# Patient Record
Sex: FEMALE | Race: WHITE | Employment: OTHER | URBAN - METROPOLITAN AREA
[De-identification: names, ages, dates, MRNs, and addresses within clinical notes are randomized per-mention and may not be internally consistent; named-entity substitution may affect disease eponyms.]

---

## 2018-08-09 PROBLEM — H35.3131: Noted: 2018-02-06

## 2018-08-09 PROBLEM — H04.123: Noted: 2018-02-06

## 2018-08-09 PROBLEM — E11.9: Noted: 2018-08-09

## 2018-08-09 PROBLEM — Z96.1: Noted: 2018-02-06

## 2018-10-03 ENCOUNTER — OFFICE VISIT (OUTPATIENT)
Dept: ORTHOPEDIC SURGERY | Age: 68
End: 2018-10-03

## 2018-10-03 VITALS
DIASTOLIC BLOOD PRESSURE: 76 MMHG | SYSTOLIC BLOOD PRESSURE: 122 MMHG | BODY MASS INDEX: 37.05 KG/M2 | RESPIRATION RATE: 16 BRPM | WEIGHT: 217 LBS | HEIGHT: 64 IN

## 2018-10-03 DIAGNOSIS — G89.29 CHRONIC BILATERAL LOW BACK PAIN WITH LEFT-SIDED SCIATICA: Primary | ICD-10-CM

## 2018-10-03 DIAGNOSIS — M54.16 LUMBAR RADICULOPATHY: ICD-10-CM

## 2018-10-03 DIAGNOSIS — R29.898 LEFT LEG WEAKNESS: ICD-10-CM

## 2018-10-03 DIAGNOSIS — M54.42 CHRONIC BILATERAL LOW BACK PAIN WITH LEFT-SIDED SCIATICA: Primary | ICD-10-CM

## 2018-10-03 PROBLEM — M54.40 CHRONIC BILATERAL LOW BACK PAIN WITH SCIATICA: Status: ACTIVE | Noted: 2018-10-03

## 2018-10-03 RX ORDER — ESZOPICLONE 3 MG/1
TABLET, FILM COATED ORAL
COMMUNITY
Start: 2018-07-27 | End: 2018-10-30 | Stop reason: SDUPTHER

## 2018-10-03 RX ORDER — METFORMIN HYDROCHLORIDE 500 MG/1
TABLET ORAL
COMMUNITY
Start: 2018-08-03

## 2018-10-03 RX ORDER — GABAPENTIN 300 MG/1
CAPSULE ORAL
COMMUNITY
Start: 2018-07-27

## 2018-10-03 RX ORDER — TOPIRAMATE 25 MG/1
TABLET ORAL
Qty: 90 TAB | Refills: 2 | Status: SHIPPED | OUTPATIENT
Start: 2018-10-03 | End: 2018-11-05 | Stop reason: SDUPTHER

## 2018-10-03 RX ORDER — PANTOPRAZOLE SODIUM 20 MG/1
TABLET, DELAYED RELEASE ORAL
COMMUNITY
Start: 2018-08-15

## 2018-10-03 RX ORDER — TIOTROPIUM BROMIDE 18 UG/1
CAPSULE ORAL; RESPIRATORY (INHALATION)
COMMUNITY
Start: 2018-08-03

## 2018-10-03 NOTE — PATIENT INSTRUCTIONS

## 2018-10-03 NOTE — PROGRESS NOTES
Chief complaint/History of Present Illness:  Chief Complaint   Patient presents with    Back Pain     NP here for low back pain, no recent injury     NEW PATIENT:    SHYAM Rogers is a  69 y.o.  female      HISTORY OF PRESENT ILLNESS:  This is a new patient appointment. Ms. Jacques Ruvalcaba comes in today as a new patient. She has had low back pain for the past 6 years. She states it started when she lived in [place] and had a bowel surgery where they accidentally stitched her sciatic nerve. They did go back in eventually and unstitched it, but since that time she has had low back pain with radiating left buttock and posterior leg pain down to her toes. It has progressively gotten worse over the years to the point over the last year she could hardly stand it. She rates her pain always between a 5/10 and 10/10. Today it is between an 8/10 and 10/10. She had breast cancer and has had bilateral mastectomies. She has gastritis, hyperlipidemia, COPD, diabetes and  macular degeneration. She has had multiple surgeries including bilateral mastectomies and the bowel and bladder surgeries. She does get bladder incontinence but that is more from a bladder problem due to the failed bladder tuck. She states she has had an injection in the past in [place]. She is not really sure what kind it was. It was in the back. It did not help at all. She has had physical therapy which made her pain worse. She is not doing a home exercise program currently due to her pain level. She describes the pain in her back as a sharp, stabbing pain and the pain in her leg as shooting pain down to her foot. She also gets terrible spasms in her left leg. She is on Neurontin 300 mg anywhere from 3 to 6 times per day depending on her pain level. Flexeril p.r.n   She has had Lyrica in the past.  It caused her to feel drugged and caused weight gain. The last MRI was over 3 years ago.       She denies fever, bowel or bladder dysfunction. She is retired. She is a nonsmoker. She does state she has some issues with her balance when she tries to walk. Her back pain is worse than the leg pain but the leg pain is quite significant. PHYSICAL EXAMINATION:  Ms. David Carmen is a 68-year-old female. She is alert and oriented. Normal mood and affect. She has a full weightbearing, slightly antalgic gait. No assistive device. She has terrible pain with hyperextension of the lumbar spine. She has 5/5 strength of her right lower extremity, 3/5 to 4/5 strength of the left lower extremity in all planes, hamstrings, quadriceps, iliotibial bands,  dorsiflexors and EHL. ASSESSMENT/PLAN:  This is a patient who has had low back pain for the past 6 years since having her bowel surgery. She has radiating left lower extremity pain. I am most concerned about the weakness in her left leg. We are going to add Topamax to her regimen, tapering up to 75 mg at night. She may continue to take the Neurontin b.i.d to t.i.d. We will get a new MRI. I will also get her records from the clinic in [place]. We will see her back after the MRI.            Review of systems:    Past Medical History:   Diagnosis Date    CA - breast cancer     Cancer [facility] [address]     breast    Chronic obstructive pulmonary disease (COPD) ([facility] [address]     Chronic obstructive pulmonary disease ([facility] [address]     Eczema      Past Surgical History:   Procedure Laterality Date    HX BLADDER REPAIR      bladder lift    HX BREAST RECONSTRUCTION      HX CHOLECYSTECTOMY      hernia    HX CHOLECYSTECTOMY      HX CYST REMOVAL      HX HERNIA REPAIR      HX HYSTERECTOMY      HX MASTECTOMY  bilateral    HX MASTECTOMY Bilateral     HX PARTIAL HYSTERECTOMY      HX RECTOCELE REPAIR      HX TONSIL AND ADENOIDECTOMY      REMOVAL OF BREAST IMPLANT       Social History     Social History    Marital status:      Spouse name: N/A    Number of children: N/A    Years of education: N/A Occupational History    Not on file. Social History Main Topics    Smoking status: Former Smoker    Smokeless tobacco: Never Used    Alcohol use No    Drug use: No    Sexual activity: Not on file     Other Topics Concern    Not on file     Social History Narrative    ** Merged History Encounter **          Family History   Problem Relation Age of Onset    Cancer Mother        Physical Exam:  Visit Vitals    /76    Resp 16    Ht 5' 4\" (1.626 m)    Wt 217 lb (98.4 kg)    BMI 37.25 kg/m2     Pain Scale: 8/10       has been . reviewed and is appropriate          Diagnoses and all orders for this visit:    1. Chronic bilateral low back pain with left-sided sciatica  -     AMB POC XRAY, SPINE, LUMBOSACRAL; 4+  -     topiramate (TOPAMAX) 25 mg tablet; 1 tab nightly  x 5 days, then 2 tabs nightly x 5 day and then 3 tabs nightly  Indications: ESSENTIAL TREMOR  -     MRI LUMB SPINE WO CONT; Future    2. Lumbar radiculopathy  -     AMB POC XRAY, SPINE, LUMBOSACRAL; 4+  -     topiramate (TOPAMAX) 25 mg tablet; 1 tab nightly  x 5 days, then 2 tabs nightly x 5 day and then 3 tabs nightly  Indications: ESSENTIAL TREMOR  -     MRI LUMB SPINE WO CONT; Future    3. Left leg weakness  -     AMB POC XRAY, SPINE, LUMBOSACRAL; 4+  -     topiramate (TOPAMAX) 25 mg tablet; 1 tab nightly  x 5 days, then 2 tabs nightly x 5 day and then 3 tabs nightly  Indications: ESSENTIAL TREMOR  -     MRI LUMB SPINE WO CONT; Future            Follow-up Disposition:  Return in about 4 weeks (around 10/31/2018) for with Dr Destinee Chisholm for MRI f/u (per Dr Destinee Chisholm).         We have informed Natalya Jacobs to notify us for immediate appointment if she has any worsening neurogical symptoms or if an emergency situation presents, then call [phone]

## 2018-10-08 ENCOUNTER — TELEPHONE (OUTPATIENT)
Dept: ORTHOPEDIC SURGERY | Age: 68
End: 2018-10-08

## 2018-10-08 DIAGNOSIS — F41.8 TEST ANXIETY: Primary | ICD-10-CM

## 2018-10-08 RX ORDER — DIAZEPAM 10 MG/1
TABLET ORAL
Qty: 1 TAB | Refills: 0 | OUTPATIENT
Start: 2018-10-08

## 2018-10-08 NOTE — TELEPHONE ENCOUNTER
Patient called to request that she be able to  the valium prescription discussed at the 10/30 office visit tomorrow. She states the valium is for upcoming MRI we ordered and that she will be in town on Tuesday.   Please contact patient to confirm this at [phone]

## 2018-10-11 ENCOUNTER — HOSPITAL ENCOUNTER (OUTPATIENT)
Dept: MRI IMAGING | Age: 68
Discharge: HOME | End: 2018-10-11
Attending: NURSE PRACTITIONER
Payer: MEDICARE

## 2018-10-11 DIAGNOSIS — R29.898 LEFT LEG WEAKNESS: ICD-10-CM

## 2018-10-11 DIAGNOSIS — M54.16 LUMBAR RADICULOPATHY: ICD-10-CM

## 2018-10-11 DIAGNOSIS — G89.29 CHRONIC BILATERAL LOW BACK PAIN WITH LEFT-SIDED SCIATICA: ICD-10-CM

## 2018-10-11 DIAGNOSIS — M54.42 CHRONIC BILATERAL LOW BACK PAIN WITH LEFT-SIDED SCIATICA: ICD-10-CM

## 2018-10-11 PROCEDURE — 72148 MRI LUMBAR SPINE W/O DYE: CPT

## 2018-10-30 ENCOUNTER — OFFICE VISIT (OUTPATIENT)
Dept: ORTHOPEDIC SURGERY | Age: 68
End: 2018-10-30

## 2018-10-30 VITALS
TEMPERATURE: 97.8 F | WEIGHT: 214.2 LBS | OXYGEN SATURATION: 96 % | DIASTOLIC BLOOD PRESSURE: 74 MMHG | RESPIRATION RATE: 17 BRPM | HEIGHT: 64 IN | BODY MASS INDEX: 36.57 KG/M2 | SYSTOLIC BLOOD PRESSURE: 122 MMHG | HEART RATE: 79 BPM

## 2018-10-30 DIAGNOSIS — G89.4 CHRONIC PAIN SYNDROME: Primary | ICD-10-CM

## 2018-10-30 DIAGNOSIS — M54.16 LUMBAR RADICULOPATHY: ICD-10-CM

## 2018-10-30 PROBLEM — E66.01 SEVERE OBESITY (HCC): Status: ACTIVE | Noted: 2018-10-30

## 2018-10-30 NOTE — PROGRESS NOTES
[facility] [address]  [address], [address]  [place], [address] [place] [address] Street  Phone: [phone]  Fax: [phone]  INITIAL CONSULTATION  Patient: Lissa Anne                MRN: [id number]       SSN: [id number]  YOB: 1950        AGE: 69 y.o. SEX: female  Body mass index is 36.77 kg/m². PCP: Kirsten Clarke MD  10/30/18    Chief Complaint   Patient presents with    Back Pain     SC         HISTORY OF PRESENT ILLNESS, RADIOGRAPHS, and PLAN:         HISTORY OF PRESENT ILLNESS:  Surgical consultation. Ms. Patricia Marcos is seen today at the request of nurse practitioner Tomas Torres. The patient is a 38-year-old female, who has had years of chronic pain, emanates from her lumbosacral junction and tends to shoot upwards. She has numerous nerve decompression procedures in her arms. None of them which have been successful. She came from [place] where she had RFA and injections, none of which has helped. 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 She is highly stressed. She denies bowel or bladder dysfunction, fevers, chills, night sweats, weight loss or weight gain. She initially said she suffered a nerve injury from a bowel surgery where they had some sort of bowel tuck procedure where the bowel had been sutured to her sacral plexus and then the sutures had to be removed. She has had chronic pain since. PHYSICAL EXAMINATION:  Her exam demonstrates no objective neurology, though she has subtle weakness of her left foot. After gentle manipulating, her exam is normal.  She gets increasing pain with increasing activity however. RADIOGRAPHS:  MRI demonstrates mild degenerative changes with mild lateral recess stenosis at 4-5, but no focal disc herniation, no instability, just an annular repair of 4-5.   She has had EMGs that are normal.      ASSESSMENT/PLAN:  I do not have any focal pathology to define her pain generator. I see nothing surgical in terms of instability or stenosis. The only notable thing was this nerve injury she had from a surgical incident years ago. She is in a highly stressed circumstance. I would recommend pain management with use of a pain psychologist as an outside change. Something like a spinal cord stimulator may be useful for her, but I would focus on more use of neuropathics and nonnarcotic pain management for her. I am concerned about her emotional status and the stresses she is under. I will have her see a pain psychologist and try to slowly ramp up her use of Topamax. She is already on Neurontin             Past Medical History:   Diagnosis Date    CA - breast cancer     Cancer ([facility] [address]     breast    Chronic obstructive pulmonary disease (COPD) (HCC)     Chronic obstructive pulmonary disease (HCC)     Eczema        Family History   Problem Relation Age of Onset    Cancer Mother        Current Outpatient Medications   Medication Sig Dispense Refill    diazePAM (VALIUM) 10 mg tablet Take 1 PO 30 minutes prior to procedure. Must have  to and from procedure. 1 Tab 0    pantoprazole (PROTONIX) 20 mg tablet       SPIRIVA WITH HANDIHALER 18 mcg inhalation capsule       metFORMIN (GLUCOPHAGE) 500 mg tablet       gabapentin (NEURONTIN) 300 mg capsule       topiramate (TOPAMAX) 25 mg tablet 1 tab nightly  x 5 days, then 2 tabs nightly x 5 day and then 3 tabs nightly  Indications: ESSENTIAL TREMOR 90 Tab 2    atorvastatin (LIPITOR) 10 mg tablet Take 10 mg by mouth daily.  esomeprazole (NEXIUM) 40 mg capsule Take 40 mg by mouth daily.  Calcium Carbonate-Vit D3-Min (CALTRATE 600+D PLUS MINERALS) 600 mg calcium- 400 unit Tab Take  by mouth.  tiotropium (SPIRIVA WITH HANDIHALER) 18 mcg inhalation capsule Take 1 Cap by inhalation daily.  fluticasone-salmeterol (ADVAIR HFA) 115-21 mcg/actuation inhaler Take  by inhalation two (2) times a day.         eszopiclone (LUNESTA) 3 mg tablet Take  by mouth nightly.  cyclobenzaprine (FLEXERIL) 10 mg tablet Take  by mouth three (3) times daily as needed.  docusate sodium (COLACE) 100 mg capsule Take 100 mg by mouth two (2) times a day.            Allergies   Allergen Reactions    Avelox [Moxifloxacin] Unknown (comments)    Codeine Unknown (comments)    Keflex [Cephalexin] Unknown (comments)    Keflex [Cephalexin] Itching and Swelling    Levaquin [Levofloxacin] Itching    Morphine Unknown (comments)    Tape [Adhesive] Unknown (comments)       Past Surgical History:   Procedure Laterality Date    HX BLADDER REPAIR      bladder lift    HX BREAST RECONSTRUCTION      HX CHOLECYSTECTOMY      hernia    HX CHOLECYSTECTOMY      HX CYST REMOVAL      HX HERNIA REPAIR      HX HYSTERECTOMY      HX MASTECTOMY  bilateral    HX MASTECTOMY Bilateral     HX PARTIAL HYSTERECTOMY      HX RECTOCELE REPAIR      HX TONSIL AND ADENOIDECTOMY      REMOVAL OF BREAST IMPLANT         Past Medical History:   Diagnosis Date    CA - breast cancer     Cancer [facility] [address]     breast    Chronic obstructive pulmonary disease (COPD) (HCC)     Chronic obstructive pulmonary disease (HCC)     Eczema        Social History     Socioeconomic History    Marital status:      Spouse name: Not on file    Number of children: Not on file    Years of education: Not on file    Highest education level: Not on file   Social Needs    Financial resource strain: Not on file    Food insecurity - worry: Not on file    Food insecurity - inability: Not on file   [employer] needs - medical: Not on file   [employer] needs - non-medical: Not on file   Occupational History    Not on file   Tobacco Use    Smoking status: Former Smoker    Smokeless tobacco: Never Used   Substance and Sexual Activity    Alcohol use: No    Drug use: No    Sexual activity: Not on file   Other Topics Concern    Not on file   Social History Narrative    ** Merged History Encounter **                REVIEW OF SYSTEMS:   CONSTITUTIONAL SYMPTOMS:  Negative. EYES:  Negative. EARS, NOSE, THROAT AND MOUTH:  Negative. CARDIOVASCULAR:  Negative. RESPIRATORY:  Negative. GENITOURINARY: Per HPI. GASTROINTESTINAL:  Per HPI. INTEGUMENTARY (SKIN AND/OR BREAST):  Negative. MUSCULOSKELETAL: Per HPI.   ENDOCRINE/RHEUMATOLOGIC:  Negative. NEUROLOGICAL:  Per HPI. HEMATOLOGIC/LYMPHATIC:  Negative. ALLERGIC/IMMUNOLOGIC:  Negative. PSYCHIATRIC:  Negative. PHYSICAL EXAMINATION:   Visit Vitals  /74   Pulse 79   Temp 97.8 °F (36.6 °C) (Oral)   Resp 17   Ht 5' 4\" (1.626 m)   Wt 214 lb 3.2 oz (97.2 kg)   SpO2 96%   BMI 36.77 kg/m²    PAIN SCALE: 8/10    CONSTITUTIONAL: The patient is in no apparent distress and is alert and oriented x 3. HEENT: Normocephalic. Hearing grossly intact. NECK: Supple and symmetric. no tenderness, or masses were felt. RESPIRATORY: No labored breathing. CARDIOVASCULAR: The carotid pulses were normal. Peripheral pulses were 2+. CHEST: Normal AP diameter and normal contour without any kyphoscoliosis. LYMPHATIC: No lymphadenopathy was appreciated in the neck, axillae or groin. SKIN:  Negative for scars, rashes, lesions, or ulcers on the right upper, right lower, left upper, left lower and trunk. NEUROLOGICAL: Alert and oriented x 3. Ambulation without assistive device. FWB. EXTREMITIES:  See musculoskeletal.  MUSCULOSKELETAL:   Head and Neck:  Negative for misalignment, asymmetry, crepitation, defects, tenderness masses or effusions.  Left Upper Extremity: Inspection, percussion and palpation performed. Connorss sign is negative.  Right Upper Extremity: Inspection, percussion and palpation performed. Connorss sign is negative.  Spine, Ribs and Pelvis: Left sided low back pain radiating to mid back. Inspection, percussion and palpation performed.  Negative for misalignment, asymmetry, crepitation, defects, tenderness masses or effusions.  Left Lower Extremity: Subtle weakness of foot. Inspection, percussion and palpation performed. Negative straight leg raise.  Right Lower Extremity: Inspection, percussion and palpation performed. Negative straight leg raise. SPINE EXAM:     Cervical spine: Neck is midline. Normal muscle tone. No focal atrophy is noted. Lumbar spine: No rash, ecchymosis, or gross obliquity. No focal atrophy is noted. ASSESSMENT    ICD-10-CM ICD-9-CM    1. Chronic pain syndrome G89.4 338.4 REFERRAL TO PSYCHOLOGY      REFERRAL TO PAIN MANAGEMENT       Written by Hector Gonzalez, as dictated by Stan Olivas MD.    I, Dr. Stan Olivas MD, confirm that all documentation is accurate.

## 2018-10-30 NOTE — PATIENT INSTRUCTIONS
Electromyogram (EMG) and Nerve Conduction Studies: About These Tests  What are they? An electromyogram (EMG) measures the electrical activity of your muscles when you are not using them (at rest) and when you tighten them (muscle contraction). Nerve conduction studies (NCS) measure how well and how fast the nerves can send electrical signals. EMG and nerve conduction studies are often done together. If they are done together, the nerve conduction studies are done before the EMG. Why are they done? You may need an EMG to find diseases that damage your muscles or nerves or to find why you cannot move your muscles (paralysis), why they feel weak, or why they twitch. You may need nerve conduction studies to find damage to the nerves that lead from the brain and spinal cord to the rest of the body (peripheral nervous system). Nerve conduction studies are often used to help find nerve disorders, such as carpal tunnel syndrome. How can you prepare for these tests? · Tell your doctors ALL the medicines, vitamins, supplements, and herbal remedies you take. Some medicines can affect the test results. You may need to stop taking some medicines before you have this test.     · If you take aspirin or some other blood thinner, be sure to talk to your doctor. He or she will tell you if you should stop taking it before your test. Make sure that you understand exactly what your doctor wants you to do.     · Wear loose-fitting clothing. You may be given a hospital gown to wear.     · The electrodes for the test are attached to your skin. Your skin needs to be clean and free of sprays, oils, creams, and lotions. What happens during the tests? You lie on a table or bed or sit in a reclining chair so your muscles are relaxed. For an EMG:  · Your doctor will insert a needle electrode into a muscle.  This will record the electrical activity while the muscle is at rest. You may feel a quick, sharp pain when the needle electrode is put into a muscle. · Your doctor will ask you to tighten the same muscle slowly and steadily while the electrical activity is recorded. · Your doctor may move the electrode to a different area of the muscle or a different muscle. For nerve conduction studies:  · Your doctor will attach two types of electrodes to your skin. ? One type of electrode is placed over a nerve and will give the nerve an electrical pulse. ? The other type of electrode is placed over the muscle that the nerve controls. It will record how long it takes the muscle to react to the electrical pulse. · You will be able to feel the electrical pulses. They are small shocks and are safe. What else should you know about these tests? · After an EMG, you may be sore and have a tingling feeling in your muscles for up to 2 days. You may have small bruises or swelling at the needle site. · For an EMG, you may be asked to sign a consent form. Talk to your doctor about any concerns you have about the need for the test, its risks, how it will be done, or what the results will mean. How long do they take? · An EMG may take 30 to 60 minutes. · Nerve conduction tests may take from 15 minutes to 1 hour or more. It depends on how many nerves and muscles your doctor tests. What happens after these tests? · If any of the test areas are sore:  ? Put ice or a cold pack on the area for 10 to 20 minutes at a time. Put a thin cloth between the ice and your skin. ? Take an over-the-counter pain medicine, such as acetaminophen (Tylenol), ibuprofen (Advil, Motrin), or naproxen (Aleve). Be safe with medicines. Read and follow all instructions on the label. · You will probably be able to go home right away. · You can go back to your usual activities right away. When should you call for help?   Watch closely for changes in your health, and be sure to contact your doctor if:  · Muscle pain from an EMG test gets worse or you have swelling, tenderness, or pus at any of the needle sites. · You have any problems that you think may be from the test.  · You have any questions about the test or have not received your results. Follow-up care is a key part of your treatment and safety. Be sure to make and go to all appointments, and call your doctor if you are having problems. It's also a good idea to keep a list of the medicines you take. Ask your doctor when you can expect to have your test results. Where can you learn more? Go to http://tirso-tone.info/. Enter F475 in the search box to learn more about \"Electromyogram (EMG) and Nerve Conduction Studies: About These Tests. \"  Current as of: June 4, 2018  Content Version: 11.8  © 8005-8280 [employer]. Care instructions adapted under license by [employer] (which disclaims liability or warranty for this information). If you have questions about a medical condition or this instruction, always ask your healthcare professional. [address] any warranty or liability for your use of this information. Chronic Pain: Care Instructions  Your Care Instructions    Chronic pain is pain that lasts a long time (months or even years) and may or may not have a clear cause. It is different from acute pain, which usually does have a clear cause--like an injury or illness--and gets better over time. Chronic pain:  · Lasts over time but may vary from day to day. · Does not go away despite efforts to end it. · May disrupt your sleep and lead to fatigue. · May cause depression or anxiety. · May make your muscles tense, causing more pain. · Can disrupt your work, hobbies, home life, and relationships with friends and family. Chronic pain is a very real condition. It is not just in your head. Treatment can help and usually includes several methods used together, such as medicines, physical therapy, exercise, and other treatments.  Learning how to relax and changing negative thought patterns can also help you cope. Chronic pain is complex. Taking an active role in your treatment will help you better manage your pain. Tell your doctor if you have trouble dealing with your pain. You may have to try several things before you find what works best for you. Follow-up care is a key part of your treatment and safety. Be sure to make and go to all appointments, and call your doctor if you are having problems. It's also a good idea to know your test results and keep a list of the medicines you take. How can you care for yourself at home? · Pace yourself. Break up large jobs into smaller tasks. Save harder tasks for days when you have less pain, or go back and forth between hard tasks and easier ones. Take rest breaks. · Relax, and reduce stress. Relaxation techniques such as deep breathing or meditation can help. · Keep moving. Gentle, daily exercise can help reduce pain over the long run. Try low- or no-impact exercises such as walking, swimming, and stationary biking. Do stretches to stay flexible. · Try heat, cold packs, and massage. · Get enough sleep. Chronic pain can make you tired and drain your energy. Talk with your doctor if you have trouble sleeping because of pain. · Think positive. Your thoughts can affect your pain level. Do things that you enjoy to distract yourself when you have pain instead of focusing on the pain. See a movie, read a book, listen to music, or spend time with a friend. · If you think you are depressed, talk to your doctor about treatment. · Keep a daily pain diary. Record how your moods, thoughts, sleep patterns, activities, and medicine affect your pain. You may find that your pain is worse during or after certain activities or when you are feeling a certain emotion. Having a record of your pain can help you and your doctor find the best ways to treat your pain. · Take pain medicines exactly as directed.   ? If the doctor gave you a prescription medicine for pain, take it as prescribed. ? If you are not taking a prescription pain medicine, ask your doctor if you can take an over-the-counter medicine. Reducing constipation caused by pain medicine  · Include fruits, vegetables, beans, and whole grains in your diet each day. These foods are high in fiber. · Drink plenty of fluids, enough so that your urine is light yellow or clear like water. If you have kidney, heart, or liver disease and have to limit fluids, talk with your doctor before you increase the amount of fluids you drink. · If your doctor recommends it, get more exercise. Walking is a good choice. Bit by bit, increase the amount you walk every day. Try for at least 30 minutes on most days of the week. · Schedule time each day for a bowel movement. A daily routine may help. Take your time and do not strain when having a bowel movement. When should you call for help? Call your doctor now or seek immediate medical care if:    · Your pain gets worse or is out of control.     · You feel down or blue, or you do not enjoy things like you once did. You may be depressed, which is common in people with chronic pain. Depression can be treated.     · You have vomiting or cramps for more than 2 hours.    Watch closely for changes in your health, and be sure to contact your doctor if:    · You cannot sleep because of pain.     · You are very worried or anxious about your pain.     · You have trouble taking your pain medicine.     · You have any concerns about your pain medicine.     · You have trouble with bowel movements, such as:  ? No bowel movement in 3 days. ? Blood in the anal area, in your stool, or on the toilet paper. ? Diarrhea for more than 24 hours. Where can you learn more? Go to http://tirso-tone.info/. Enter N004 in the search box to learn more about \"Chronic Pain: Care Instructions. \"  Current as of: June 4, 2018  Content Version: 11.8  © 1656-5213 [employer]. Care instructions adapted under license by [employer] (which disclaims liability or warranty for this information). If you have questions about a medical condition or this instruction, always ask your healthcare professional. [place] [address] any warranty or liability for your use of this information.

## 2018-10-31 ENCOUNTER — DOCUMENTATION ONLY (OUTPATIENT)
Dept: ORTHOPEDIC SURGERY | Age: 68
End: 2018-10-31

## 2018-10-31 NOTE — PROGRESS NOTES
I have faxed order and office notes to Dr. Michelle Mak. They will contact the patient for an appointment. Patient aware of process.

## 2018-11-05 ENCOUNTER — TELEPHONE (OUTPATIENT)
Dept: ORTHOPEDIC SURGERY | Age: 68
End: 2018-11-05

## 2018-11-05 DIAGNOSIS — G89.29 CHRONIC BILATERAL LOW BACK PAIN WITH LEFT-SIDED SCIATICA: ICD-10-CM

## 2018-11-05 DIAGNOSIS — M54.42 CHRONIC BILATERAL LOW BACK PAIN WITH LEFT-SIDED SCIATICA: ICD-10-CM

## 2018-11-05 DIAGNOSIS — R29.898 LEFT LEG WEAKNESS: ICD-10-CM

## 2018-11-05 DIAGNOSIS — M54.16 LUMBAR RADICULOPATHY: ICD-10-CM

## 2018-11-05 RX ORDER — TOPIRAMATE 25 MG/1
TABLET ORAL
Qty: 120 TAB | Refills: 2 | Status: SHIPPED | OUTPATIENT
Start: 2018-11-05

## 2018-11-05 NOTE — TELEPHONE ENCOUNTER
Called patient and verified . Patient was informed that prescription was called into pharmacy. Patient was questioning why 1 tab and not 3 in the am and 3 qhs like Dr. Renita Jha told her. Informed patient there isn't a note but that I could send a message to the NP and call her back. She stated she will just try this and see what happens. Informed patient if she has any more questions or concerns to give us a call back. Patient verbalized understanding and no further questions or concerns at this time.

## 2018-11-05 NOTE — TELEPHONE ENCOUNTER
Called Brian at [address] and clarified, patient can take 1 tab in the am and 3 tabs at night. Salty Connolly verbalized understanding.

## 2018-11-05 NOTE — TELEPHONE ENCOUNTER
Called patient and verified . Patient stated she is taking 3 tabs at night and she stated Dr. Charly Rangel had mentioned using it bid but she stated she is only taking 3 tabs at night for the moment. Please advise.

## 2018-11-05 NOTE — TELEPHONE ENCOUNTER
Patient states that she will need Topamax called in because Dr. Alba Douglas talk to her about increasing.     Please advise [phone]    [facility] in [address] [place] [address]

## 2018-11-05 NOTE — TELEPHONE ENCOUNTER
Viry Gupta with [facility] in [place] [phone] needs clarification on the Topamax 25mg that was called in today.

## 2019-03-18 ENCOUNTER — IMPORTED ENCOUNTER (OUTPATIENT)
Dept: URBAN - NONMETROPOLITAN AREA CLINIC 1 | Facility: CLINIC | Age: 69
End: 2019-03-18

## 2019-03-18 PROCEDURE — 92014 COMPRE OPH EXAM EST PT 1/>: CPT

## 2019-03-18 NOTE — PATIENT DISCUSSION
PCIOL w Open Caps OUmultifocal oustable ouDES- with ocular allergies todayeducate ptstart pf qid ou x 1wk then bid ou x 1wkcont tears qid ouARMD high risk(+) family hxAREDS2 Amsler grid dailyDM s DR-Stressed the importance of keeping blood sugars under control blood pressure under control and weight normalization and regular visits with PCP. -Explained the possible effects of poorly controlled diabetes and the damage that diabetes can cause to ocular health. -Patient to check HgbA1C.-Pt instructed to contact our office with any vision changes.; Dr's Notes: OCT MAC 8/9/18

## 2019-09-24 ENCOUNTER — IMPORTED ENCOUNTER (OUTPATIENT)
Dept: URBAN - NONMETROPOLITAN AREA CLINIC 1 | Facility: CLINIC | Age: 69
End: 2019-09-24

## 2019-09-24 PROCEDURE — 92014 COMPRE OPH EXAM EST PT 1/>: CPT

## 2019-09-24 PROCEDURE — 92134 CPTRZ OPH DX IMG PST SGM RTA: CPT

## 2019-09-24 NOTE — PATIENT DISCUSSION
PCIOL w Open Caps OUmultifocal oustable ouDES- with ocular allergies todayeducate ptstart pf qid ou x 1wk then bid ou x 1wkcont tears qid ouARMD high risk(+) family hxAREDS2 Amsler grid dailyoct today stableDM s -Stressed the importance of keeping blood sugars under control blood pressure under control and weight normalization and regular visits with PCP. -Explained the possible effects of poorly controlled diabetes and the damage that diabetes can cause to ocular health. -Patient to check HgbA1C.-Pt instructed to contact our office with any vision changes.; Dr's Notes: OCT MAC 8/9/18

## 2020-01-29 ENCOUNTER — IMPORTED ENCOUNTER (OUTPATIENT)
Dept: URBAN - NONMETROPOLITAN AREA CLINIC 1 | Facility: CLINIC | Age: 70
End: 2020-01-29

## 2020-01-29 NOTE — PATIENT DISCUSSION
rx checkrx givenPCIOL w Open Caps OUmultifocal oustable ouDES- with ocular allergies todayeducate ptstart pf qid ou x 1wk then bid ou x 1wkcont tears qid ouARMD high risk(+) family hxAREDS2 Amsler grid dailyoct today stableDM s DR-Stressed the importance of keeping blood sugars under control blood pressure under control and weight normalization and regular visits with PCP. -Explained the possible effects of poorly controlled diabetes and the damage that diabetes can cause to ocular health. -Patient to check HgbA1C.-Pt instructed to contact our office with any vision changes.; Dr's Notes: OCT MAC 8/9/18

## 2020-09-11 ENCOUNTER — IMPORTED ENCOUNTER (OUTPATIENT)
Dept: URBAN - NONMETROPOLITAN AREA CLINIC 1 | Facility: CLINIC | Age: 70
End: 2020-09-11

## 2020-09-11 PROCEDURE — 92014 COMPRE OPH EXAM EST PT 1/>: CPT

## 2020-09-11 NOTE — PATIENT DISCUSSION
PCIOL w Open Caps OUmultifocal oustable oumonitor for pcoDES- with ocular allergies todayeducate ptstart pf qid ou x 1wk then bid ou x 1wkcont tears qid ouAMD - dry-Explained dry AMD and advised that there are no treatments available at this time.-Continue AREDS 2 MVT. -Continue Amsler grid monitoring daily. Pt is to contact our office if any changes are noted. DM s DR-Stressed the importance of keeping blood sugars under control blood pressure under control and weight normalization and regular visits with PCP. -Explained the possible effects of poorly controlled diabetes and the damage that diabetes can cause to ocular health. -Patient to check HgbA1C.-Pt instructed to contact our office with any vision changes.; Dr's Notes: OCT MAC 8/9/18

## 2021-05-03 ENCOUNTER — IMPORTED ENCOUNTER (OUTPATIENT)
Dept: URBAN - NONMETROPOLITAN AREA CLINIC 1 | Facility: CLINIC | Age: 71
End: 2021-05-03

## 2021-05-03 PROBLEM — E11.9: Noted: 2021-05-03

## 2021-05-03 PROBLEM — H04.123: Noted: 2021-05-03

## 2021-05-03 PROBLEM — H35.3131: Noted: 2021-05-03

## 2021-05-03 PROBLEM — H10.45: Noted: 2021-05-03

## 2021-05-03 PROBLEM — Z96.1: Noted: 2021-05-03

## 2021-05-03 PROCEDURE — 92012 INTRM OPH EXAM EST PATIENT: CPT

## 2021-05-03 NOTE — PATIENT DISCUSSION
allergic conj oueducate pt start pf qid ou x 10 dayspt movingPCIOL w Open Caps OUmultifocal oustable oumonitor for pcoDES- with ocular allergies todayeducate ptstart pf qid ou x 1wk then bid ou x 1wkcont tears qid ouAMD - dry-Explained dry AMD and advised that there are no treatments available at this time.-Continue AREDS 2 MVT. -Continue Amsler grid monitoring daily. Pt is to contact our office if any changes are noted. DM s -Stressed the importance of keeping blood sugars under control blood pressure under control and weight normalization and regular visits with PCP. -Explained the possible effects of poorly controlled diabetes and the damage that diabetes can cause to ocular health. -Patient to check HgbA1C.-Pt instructed to contact our office with any vision changes.; 's Notes: OCT MAC 8/9/18

## 2021-09-30 ENCOUNTER — NEW PATIENT COMPREHENSIVE (OUTPATIENT)
Dept: URBAN - METROPOLITAN AREA CLINIC 49 | Facility: CLINIC | Age: 71
End: 2021-09-30

## 2021-09-30 DIAGNOSIS — H35.3211: ICD-10-CM

## 2021-09-30 DIAGNOSIS — H35.3122: ICD-10-CM

## 2021-09-30 PROCEDURE — 92015 DETERMINE REFRACTIVE STATE: CPT

## 2021-09-30 PROCEDURE — 92134 CPTRZ OPH DX IMG PST SGM RTA: CPT

## 2021-09-30 PROCEDURE — 92004 COMPRE OPH EXAM NEW PT 1/>: CPT

## 2021-09-30 ASSESSMENT — VISUAL ACUITY
OU_SC: 20/40
OD_GLARE: 20/400
OD_SC: 20/50
OS_SC: 20/40
OU_CC: J2
OS_CC: 20/40
OD_CC: 20/40
OS_GLARE: 20/400
OU_CC: 20/40+2

## 2021-09-30 ASSESSMENT — TONOMETRY
OD_IOP_MMHG: 16
OS_IOP_MMHG: 17

## 2021-09-30 NOTE — PATIENT DISCUSSION
SRF OD on exam today. Will refer to [facility] for further evaluation/treatment.

## 2021-11-09 ENCOUNTER — REFRACTION RECHECK (OUTPATIENT)
Dept: URBAN - METROPOLITAN AREA CLINIC 49 | Facility: CLINIC | Age: 71
End: 2021-11-09

## 2021-11-09 DIAGNOSIS — H52.4: ICD-10-CM

## 2021-11-09 PROCEDURE — 92015NC REFRACTION NO CHARGE

## 2021-11-09 ASSESSMENT — VISUAL ACUITY
OU_CC: J3
OD_CC: 20/70
OD_PH: 20/50
OU_CC: 20/40
OS_CC: 20/40

## 2022-04-15 ASSESSMENT — VISUAL ACUITY
OD_SC: 20/40
OD_CC: J1
OD_CC: 20/60
OD_CC: 20/40
OU_SC: 20/40
OS_CC: J3
OD_SC: 20/60
OS_SC: 20/40
OU_CC: 20/50
OS_SC: 20/60
OS_SC: 20/40
OS_CC: J1
OD_SC: 20/60
OD_CC: J3
OS_CC: 20/60
OU_CC: 20/30
OS_CC: 20/40

## 2022-04-15 ASSESSMENT — TONOMETRY
OS_IOP_MMHG: 18
OD_IOP_MMHG: 18
OS_IOP_MMHG: 18
OD_IOP_MMHG: 18
OD_IOP_MMHG: 19
OS_IOP_MMHG: 18
OD_IOP_MMHG: 19
OS_IOP_MMHG: 19

## 2022-05-12 ENCOUNTER — ESTABLISHED PATIENT (OUTPATIENT)
Dept: URBAN - METROPOLITAN AREA CLINIC 49 | Facility: CLINIC | Age: 72
End: 2022-05-12

## 2022-05-12 DIAGNOSIS — H35.3122: ICD-10-CM

## 2022-05-12 DIAGNOSIS — H35.3211: ICD-10-CM

## 2022-05-12 PROCEDURE — 92134 CPTRZ OPH DX IMG PST SGM RTA: CPT

## 2022-05-12 PROCEDURE — 92014 COMPRE OPH EXAM EST PT 1/>: CPT

## 2022-05-12 ASSESSMENT — VISUAL ACUITY
OU_CC: J10
OS_SC: 20/50
OD_CC: 20/200-
OD_SC: 20/60-
OS_CC: 20/200-

## 2022-05-12 ASSESSMENT — TONOMETRY
OS_IOP_MMHG: 16
OD_IOP_MMHG: 16

## 2022-05-12 NOTE — PATIENT DISCUSSION
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 Recommend referral to [facility] within 1 week with Dr. Joaquim Hollingsworth. Patient already followed will send today's note.

## 2023-04-20 ENCOUNTER — COMPREHENSIVE EXAM (OUTPATIENT)
Dept: URBAN - METROPOLITAN AREA CLINIC 49 | Facility: CLINIC | Age: 73
End: 2023-04-20

## 2023-04-20 DIAGNOSIS — H04.123: ICD-10-CM

## 2023-04-20 DIAGNOSIS — H35.3211: ICD-10-CM

## 2023-04-20 DIAGNOSIS — H35.3123: ICD-10-CM

## 2023-04-20 DIAGNOSIS — H52.4: ICD-10-CM

## 2023-04-20 DIAGNOSIS — H11.31: ICD-10-CM

## 2023-04-20 PROCEDURE — 92134 CPTRZ OPH DX IMG PST SGM RTA: CPT

## 2023-04-20 PROCEDURE — 92014 COMPRE OPH EXAM EST PT 1/>: CPT

## 2023-04-20 PROCEDURE — 92015 DETERMINE REFRACTIVE STATE: CPT

## 2023-04-20 ASSESSMENT — KERATOMETRY
OD_AXISANGLE2_DEGREES: 63
OD_AXISANGLE_DEGREES: 153
OD_K1POWER_DIOPTERS: 45.00
OS_K2POWER_DIOPTERS: 45.25
OS_AXISANGLE2_DEGREES: 132
OD_K2POWER_DIOPTERS: 45.50
OS_K1POWER_DIOPTERS: 44.25
OS_AXISANGLE_DEGREES: 042

## 2023-04-20 ASSESSMENT — VISUAL ACUITY
OS_SC: 20/400+1
OU_SC: J10@14"
OD_SC: 20/50
OD_PH: 20/40
OU_SC: 20/50

## 2023-04-20 ASSESSMENT — TONOMETRY
OS_IOP_MMHG: 14
OD_IOP_MMHG: 15